# Patient Record
Sex: MALE | Race: WHITE | HISPANIC OR LATINO
[De-identification: names, ages, dates, MRNs, and addresses within clinical notes are randomized per-mention and may not be internally consistent; named-entity substitution may affect disease eponyms.]

---

## 2022-11-08 PROBLEM — Z00.00 ENCOUNTER FOR PREVENTIVE HEALTH EXAMINATION: Status: ACTIVE | Noted: 2022-11-08

## 2022-11-18 ENCOUNTER — APPOINTMENT (OUTPATIENT)
Dept: ORTHOPEDIC SURGERY | Facility: CLINIC | Age: 72
End: 2022-11-18

## 2022-11-18 VITALS — HEIGHT: 66 IN | BODY MASS INDEX: 30.53 KG/M2 | WEIGHT: 190 LBS

## 2022-11-18 DIAGNOSIS — E78.00 PURE HYPERCHOLESTEROLEMIA, UNSPECIFIED: ICD-10-CM

## 2022-11-18 DIAGNOSIS — Z78.9 OTHER SPECIFIED HEALTH STATUS: ICD-10-CM

## 2022-11-18 DIAGNOSIS — M54.12 RADICULOPATHY, CERVICAL REGION: ICD-10-CM

## 2022-11-18 DIAGNOSIS — M50.90 CERVICAL DISC DISORDER, UNSPECIFIED, UNSPECIFIED CERVICAL REGION: ICD-10-CM

## 2022-11-18 PROCEDURE — 99204 OFFICE O/P NEW MOD 45 MIN: CPT

## 2022-11-18 PROCEDURE — 72050 X-RAY EXAM NECK SPINE 4/5VWS: CPT

## 2022-11-18 RX ORDER — ATORVASTATIN CALCIUM 80 MG/1
TABLET, FILM COATED ORAL
Refills: 0 | Status: ACTIVE | COMMUNITY

## 2022-11-18 NOTE — HISTORY OF PRESENT ILLNESS
[Neck] : neck [0] : 0 [de-identified] : 11/18/22: 73 y/o rhd  male c/o chronic neck stiff.  Total body stiffness noted.   No recent injury. Describes stiffness in the morning. Denies radiating pain. No loss of fine motor.  No loss of bb control\par \par He saw his PCP a few months ago, who ordered MRI (he does not know where).\par No chiro/accupuncture\par not taking medication for this \par no hx of surgery \par \par No eval with neurology\par His father has parkinsons\par \par PMHX: hyperlipidemia, denies CA history\par \par OccHx: retired school maintenance\par \par xrays today:\par C spine - mild spondylosis  [] : no [FreeTextEntry5] : JOSELYN MCMAHON is a 72 year old M here for an evaluation of neck pain. Pt states he sometimes feels a stiffness in his neck, no pain. Pt states the stiffness started years ago, stiffness is usually when he sleeps and wakes up. He mentions some pain in his left knee.

## 2022-11-18 NOTE — DISCUSSION/SUMMARY
[de-identified] : Discussion that a diffuse sensation of difficultly initiating movement with pain may be associated parkinson (which his father had) - rec neurology eval and PT \par